# Patient Record
Sex: FEMALE | Race: WHITE
[De-identification: names, ages, dates, MRNs, and addresses within clinical notes are randomized per-mention and may not be internally consistent; named-entity substitution may affect disease eponyms.]

---

## 2018-10-04 ENCOUNTER — HOSPITAL ENCOUNTER (OUTPATIENT)
Dept: HOSPITAL 53 - M LAB REF | Age: 68
End: 2018-10-04
Attending: DERMATOLOGY
Payer: MEDICARE

## 2018-10-04 DIAGNOSIS — L98.8: Primary | ICD-10-CM

## 2018-10-04 PROCEDURE — 88305 TISSUE EXAM BY PATHOLOGIST: CPT

## 2021-07-12 ENCOUNTER — HOSPITAL ENCOUNTER (OUTPATIENT)
Dept: HOSPITAL 53 - M RAD | Age: 71
End: 2021-07-12
Attending: INTERNAL MEDICINE
Payer: MEDICARE

## 2021-07-12 DIAGNOSIS — N83.202: Primary | ICD-10-CM

## 2021-07-12 NOTE — REP
INDICATION:

LT OVARION CYST.



COMPARISON:

No prior ultrasound exams



TECHNIQUE:

Transvesical and transvaginal scanning



FINDINGS:

The uterus measures 3.9 x 1.9 x 2.9 cm.  The parenchymal echo pattern is within normal

limits.  The endometrial echo complex is suboptimally visualized due to the

echodensity of the uterus.



There is no free fluid



Neither ovary could be visualized transvesical air transvaginally.



Urinary bladder measures 11 x 9 x 10 cm.



IMPRESSION:

Limited examination as described above.  Pre and post gadolinium enhanced pelvic MRI

is recommended if a pelvic abnormality is of clinical concern.





<Electronically signed by Tito Davison > 07/12/21 [id number]

## 2021-09-23 ENCOUNTER — HOSPITAL ENCOUNTER (EMERGENCY)
Dept: HOSPITAL 53 - M ED | Age: 71
Discharge: HOME | End: 2021-09-23
Payer: MEDICARE

## 2021-09-23 VITALS — WEIGHT: 130.27 LBS | BODY MASS INDEX: 21.7 KG/M2 | HEIGHT: 65 IN

## 2021-09-23 VITALS — SYSTOLIC BLOOD PRESSURE: 137 MMHG | DIASTOLIC BLOOD PRESSURE: 74 MMHG
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
## 2021-09-23 DIAGNOSIS — S82.002A: Primary | ICD-10-CM
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
## 2021-09-23 NOTE — REP
INDICATION:

fall



COMPARISON:

None.



TECHNIQUE:

Five views left knee.



FINDINGS:

There is nondisplaced transverse fracture of the lower pole of the patella.  There is

no other evidence of acute fracture or dislocation.  There is slight narrowing of the

lateral patellofemoral joint.



IMPRESSION:

Nondisplaced transverse fracture lower pole patella.





<Electronically signed by Bharat Torres > 09/23/21 1308